# Patient Record
Sex: FEMALE | Race: WHITE
[De-identification: names, ages, dates, MRNs, and addresses within clinical notes are randomized per-mention and may not be internally consistent; named-entity substitution may affect disease eponyms.]

---

## 2018-05-09 ENCOUNTER — HOSPITAL ENCOUNTER (OUTPATIENT)
Dept: HOSPITAL 80 - FIMAGING | Age: 33
End: 2018-05-09
Attending: PHYSICIAN ASSISTANT
Payer: SELF-PAY

## 2018-05-09 DIAGNOSIS — O20.9: Primary | ICD-10-CM

## 2018-05-09 DIAGNOSIS — Z3A.14: ICD-10-CM

## 2018-06-09 ENCOUNTER — HOSPITAL ENCOUNTER (EMERGENCY)
Dept: HOSPITAL 80 - FED | Age: 33
LOS: 1 days | Discharge: HOME | End: 2018-06-10
Payer: MEDICAID

## 2018-06-09 DIAGNOSIS — O23.12: Primary | ICD-10-CM

## 2018-06-09 DIAGNOSIS — E86.9: ICD-10-CM

## 2018-06-09 DIAGNOSIS — Z3A.18: ICD-10-CM

## 2018-06-09 DIAGNOSIS — O99.89: ICD-10-CM

## 2018-06-09 DIAGNOSIS — R19.7: ICD-10-CM

## 2018-06-09 LAB — PLATELET # BLD: 193 10^3/UL (ref 150–400)

## 2018-06-09 NOTE — EDPHY
HPI/HX/ROS/PE/MDM


Narrative: 


CHIEF COMPLAINT: 18 weeks pregnant, cramping





HISTORY OF PRESENT ILLNESS:   


The patient is a  18 week pregnant 32 y/o female with a history of pre-

eclampsia complaining of abdominal cramping, onset yesterday. Yesterday the 

cramping began under her ribs and radiated down her abdomen. She took her 

prescribed sleeping medication and was able to sleep several hours prior to 

waking up due to the cramping. She took some more medication and slept several 

more hours. When she woke up this morning she was asymptomatic for a couple of 

hours but did vomit. Throughout this pregnancy she has been nauseous and vomits 

regularly but has a normal appetite. The cramping eventually returned so she 

took her sleeping medication and slept for several hours. When she woke up this 

afternoon, her symptoms had not improved. Throughout the afternoon her cramping 

was primarily in her abdomen, but she did develop back pain. The cramping at 

this time felt "hard" and similar to a contraction. She has not felt fetal 

movement yet. In addition to her cramping symptoms she has felt more lightheaded

, has frequent urination, and has had diarrhea for the past 2 days. During one 

episode of diarrhea she thought there was blood present. She denies recent 

travel.





No fever, chills, chest pain, shortness of breath, palpitations, constipation, 

hematuria, headache.





Followed by Dr. Merle Dillard OB/GYN.





REVIEW OF SYSTEMS:


Aside from elements discussed in the HPI, a comprehensive 10-point review of 

systems was reviewed and is negative.





PAST MEDICAL HISTORY: Pre-eclampsia, scoliosis, tonsillectomy, ovarian cysts, 

right shoulder injury   





SOCIAL HISTORY: , employed, lives in [place]





VITAL SIGNS: Reviewed by me


GENERAL: Well-developed, well-nourished, resting comfortably in no respiratory 

distress.


HEENT: Atraumatic. Eyes: No icterus, no injection. Mouth: moist mucous 

membranes.  No erythema or lesions. Neck: supple with no adenopathy.


LUNGS: Clear to auscultation bilaterally, no wheezes, rhonchi or rales.


CARDIAC: Regular rate and rhythm, no rubs, murmurs or gallops.


ABDOMEN: Bilateral lower abdominal tenderness, mild uterine tenderness.  No 

guarding or rebound.  Soft, nondistended, bowel sounds normal.


BACK:  No CVA tenderness.


EXTREMITIES: No trauma. No edema.  Range of motion is normal throughout.


NEURO: Alert and oriented,  grossly nonfocal.  


SKIN: Warm and dry, no rash.


PSYCHIATRIC: Normal mentation, no agitation.





Portions of this note were transcribed by a medical scribe.  I personally 

performed a history, physical exam, medical decision making, and confirmed 

accuracy of information the transcribed note.





ED Course: 


The patient is a  18 week pregnant 32 y/o female with a history of pre-

eclampsia complaining of abdominal and back cramping, onset yesterday. On exam 

she has bilateral lower abdominal tenderness and mild uterine tenderness. Labs 

and OB US ordered; 1L IV NS administered.





Labs are reassuring.  Ultrasound demonstrates normal fetal activity, normal 

placenta.





On re-examination the patient reports she continues to feel some abdominal 

cramping.  She has received a L of normal saline but a 2nd will be given.  She 

also received 50 mcg of fentanyl.


Patient's urine has 3-5 white blood cells per high-power field but 4+ bacteria, 

and trace leukocyte esterase.


She received a g of ceftriaxone in the emergency department and was discharged 

on Keflex.





Suspect patient's symptoms may be related to either urinary tract infection, or 

gastroenteritis.  At this point the patient has not provided a stool sample.  

She will be discharged home with instructions regarding how to return with a 

stool sample.  She understands reasons to return to the emergency department 

including worsening cramping, vaginal bleeding, passage of tissue, leakage of 

fluid, fevers, or other concerns.








MDM: 





Differential diagnoses for the patient's symptom complex was considered 

including but not limited to gastroenteritis, viral gastroenteritis, urinary 

tract infection, dehydration, threatened miscarriage.





- Data Points


Imaging Results: 


US:


Impression: 


1. Single viable intrauterine gestation without evidence of placenta previa or 

abruption. 


 


Results called to Dr. Olivo at 11:30 PM. 


 


               Dictated By: Dalton Walker MD 


 





Imaging: Discussed imaging studies w/ On call Radiologist


Laboratory Results: 


 Laboratory Results





 18 22:35 





 18 22:35 








Medications Given: 


 








Discontinued Medications





Cephalexin (Keflex 500 Mg Prepack#4)  1 btl TAKEHOME EDNOW ONE


   PRN Reason: Protocol


   Stop: 18 23:37


   Last Admin: 18 23:39 Dose:  1 btl


Cephalexin HCl (Keflex)  500 mg PO EDNOW ONE


   PRN Reason: Protocol


   Stop: 18 23:36


   Last Admin: 18 23:40 Dose:  Not Given


Fentanyl (Sublimaze)  75 mcg IVP EDNOW ONE


   Stop: 18 23:47


   Last Admin: 06/10/18 00:01 Dose:  75 mcg


Sodium Chloride (Ns)  1,000 mls @ 0 mls/hr IV EDNOW ONE; Wide Open


   PRN Reason: Protocol


   Stop: 18 22:12


   Last Admin: 18 22:35 Dose:  1,000 mls


Ceftriaxone Sodium/Dextrose (Rocephin 1 Gm (Premix))  50 mls @ 100 mls/hr IV 

EDNOW ONE


   PRN Reason: Protocol


   Stop: 06/10/18 00:15


   Last Admin: 06/10/18 00:02 Dose:  50 mls


Sodium Chloride (Ns)  1,000 mls @ 0 mls/hr IV ONCE ONE; Wide Open


   PRN Reason: Protocol


   Stop: 18 23:48


   Last Admin: 06/10/18 00:01 Dose:  1,000 mls





Microbiology Results: 


 MICROBIOLOGY





18 23:00   Urine,Clean Catch   Urine Culture - Preliminary


                            Two [place] Types








General


Time Seen by Provider: 18 22:12


Initial Vital Signs: 


 Initial Vital Signs











Temperature (C)  36.7 C   18 21:58


 


Heart Rate  99   18 21:58


 


Respiratory Rate  20   18 21:58


 


Blood Pressure  118/89 H  18 21:58


 


O2 Sat (%)  95   18 21:58








 











O2 Delivery Mode               Room Air














Allergies/Adverse Reactions: 


 





mushroom Allergy (Verified 18 21:58)


 








Home Medications: 














 Medication  Instructions  Recorded


 


Levothyroxine  16


 


Adderall 10 MG (*)  10/05/16


 


Famotidine [Pepcid] 20 mg PO BID #6 tab 10/05/16


 


diphenhydrAMINE [Benadryl 50 MG 50 mg PO Q4-6PRN PRN #7 cap 10/05/16





(*)]  


 


predniSONE [prednisone 20mg (RX)] 60 mg PO DAILY #9 tab 10/05/16


 


Cephalexin [Keflex (RX)] 500 mg PO TID 6 Days  cap 18














Departure





- Departure


Disposition: Home, Routine, Self-Care


Clinical Impression: 


Urinary tract infection


Qualifiers:


 Urinary tract infection type: acute cystitis Hematuria presence: with 

hematuria Qualified Code(s): N30.01 - Acute cystitis with hematuria





Diarrhea


Qualifiers:


 Diarrhea type: unspecified type Qualified Code(s): R19.7 - Diarrhea, 

unspecified





Condition: Good


Instructions:  Cephalexin (By mouth), Urinary Tract Infection in Women (ED)


Additional Instructions: 


You're urine is concerning for urinary tract infection.  Please take Keflex 500 

mg by mouth 3 times a day for the next 7 days.  Urine will be sent for culture 

as well.  You will be contacted if you need to change your antibiotic.





Please drink plenty of fluids.





You may use Tylenol as needed for crampy discomfort.





Lab tests for stool cultures has been ordered.  If you continue to have diarrhea

, you may bring a sample to [facility] outpatient lab and this 

will be analyzed.





If you continue to have abdominal discomfort, especially on the right lower side

, or if he develops a fever, persistent vomiting, are not improving as expected

, please return to the emergency department or seek care urgently.





If you developed vaginal bleeding or increasing abdominal pain, please return 

to the emergency department or seek care urgently.


Referrals: 


Merle Dillard DO [Primary Care Provider] - As per Instructions


Prescriptions: 


Cephalexin [Keflex (RX)] 500 mg PO TID 6 Days  cap


Report Scribed for: Jemima Olivo


Report Scribed by: Kristina Adkins


Date of Report: 18


Time of Report: 22:13

## 2018-06-10 VITALS — DIASTOLIC BLOOD PRESSURE: 70 MMHG | SYSTOLIC BLOOD PRESSURE: 108 MMHG
